# Patient Record
Sex: MALE | Race: WHITE | Employment: OTHER | URBAN - METROPOLITAN AREA
[De-identification: names, ages, dates, MRNs, and addresses within clinical notes are randomized per-mention and may not be internally consistent; named-entity substitution may affect disease eponyms.]

---

## 2021-04-02 ENCOUNTER — VIRTUAL VISIT (OUTPATIENT)
Dept: INTERNAL MEDICINE CLINIC | Age: 41
End: 2021-04-02

## 2021-04-02 DIAGNOSIS — M54.42 CHRONIC LOW BACK PAIN WITH LEFT-SIDED SCIATICA, UNSPECIFIED BACK PAIN LATERALITY: ICD-10-CM

## 2021-04-02 DIAGNOSIS — G89.29 CHRONIC LOW BACK PAIN WITH LEFT-SIDED SCIATICA, UNSPECIFIED BACK PAIN LATERALITY: ICD-10-CM

## 2021-04-02 DIAGNOSIS — R53.83 FATIGUE, UNSPECIFIED TYPE: Primary | ICD-10-CM

## 2021-04-02 PROCEDURE — 99202 OFFICE O/P NEW SF 15 MIN: CPT | Performed by: INTERNAL MEDICINE

## 2021-04-02 NOTE — PATIENT INSTRUCTIONS
Office Policies Phone calls/patient messages: Please allow up to 24 hours for someone in the office to contact you about your call or message. Be mindful your provider may be out of the office or your message may require further review. We encourage you to use [facility] for your messages as this is a faster, more efficient way to communicate with our office Medication Refills: 
         
Prescription medications require 48-72 business hours to process. We encourage you to use [facility] for your refills. For controlled medications: Please allow 72 business hours to process. Certain medications may require you to  a written prescription at our office. NO narcotic/controlled medications will be prescribed after 4pm Monday through Friday or on weekends Form/Paperwork Completion: 
         
Please note a $25 fee may incur for all paperwork for completed by our providers. We ask that you allow 7-10 business days. Pre-payment is due prior to picking up/faxing the completed form. You may also download your forms to [facility] to have your doctor print off. This is an established visit conducted via telemedicine. The patient has been instructed that this meets HIPAA criteria and acknowledges and agrees to this method of visitation.  
 
Skyler Bentley, [place] 
70/58/10 
69:22 AM

## 2021-04-02 NOTE — PROGRESS NOTES
HISTORY OF PRESENT ILLNESS  Evangeline Cooks is a 36 y.o. male. HPI     Evangeline Cooks, was evaluated through a synchronous (real-time) audio-video encounter. The patient (or guardian if applicable) is aware that this is a billable service. Verbal consent to proceed has been obtained within the past 12 months. The visit was conducted pursuant to the emergency declaration under the [address] [place] [address] and the [employer] and [employer] General Act. Patient identification was verified, and a caregiver was present when appropriate. The patient was located in a state where the provider was credentialed to provide care. --Nancy Ma MD on 4/2/2021 at 10:45 AM    An electronic signature was used to authenticate this note. Pt here to establish care  Former PCP-none    concerned about chronic low back pain x years  Has radiation of pain to left leg maybe once per week  No injury  Works in construction and painting    C/o chronc fatigue x years  No witnessed apnea per wife  Positive tension headaches  Feels like he does not get good quality sleep but usually sleeps through the night  Not depressed  Stable weight        There are no active problems to display for this patient.       Not on File   Lab Results   Component Value Date/Time    WBC 6.2 04/15/2021 05:04 PM    HGB 14.9 04/15/2021 05:04 PM    HCT 45.4 04/15/2021 05:04 PM    PLATELET 560 38/07/0199 05:04 PM    MCV 91.0 04/15/2021 05:04 PM     Lab Results   Component Value Date/Time    Glucose 98 04/15/2021 05:04 PM    Creatinine 0.99 04/15/2021 05:04 PM      No results found for: CHOL, CHOLPOCT, HDL, LDL, LDLC, LDLCPOC, LDLCEXT, TRIGL, TGLPOCT, CHHD, CHHDX  Lab Results   Component Value Date/Time    GFR est non-AA >60 04/15/2021 05:04 PM    GFR est AA >60 04/15/2021 05:04 PM    Creatinine 0.99 04/15/2021 05:04 PM    BUN 10 04/15/2021 05:04 PM    Sodium 139 04/15/2021 05:04 PM Potassium 4.3 04/15/2021 05:04 PM    Chloride 105 04/15/2021 05:04 PM    CO2 29 04/15/2021 05:04 PM        Review of Systems   Constitutional: Positive for malaise/fatigue. Negative for chills, fever and weight loss. HENT: Negative. Eyes: Negative for blurred vision and double vision. Respiratory: Negative for cough and shortness of breath. Cardiovascular: Negative for chest pain and palpitations. Gastrointestinal: Negative for abdominal pain, blood in stool, constipation, diarrhea, melena, nausea and vomiting. Genitourinary: Negative for dysuria, frequency, hematuria and urgency. Musculoskeletal: Positive for back pain. Negative for falls, joint pain and myalgias. Skin: Negative. Neurological: Negative for dizziness, tremors and headaches. Endo/Heme/Allergies: Negative. Psychiatric/Behavioral: Negative. Physical Exam  Pulmonary:      Effort: Pulmonary effort is normal.   Neurological:      General: No focal deficit present. Mental Status: He is alert. Psychiatric:         Mood and Affect: Mood normal.         Behavior: Behavior normal.         Thought Content: Thought content normal.         Judgment: Judgment normal.         ASSESSMENT and PLAN  Diagnoses and all orders for this visit:    1. Fatigue, unspecified type   Check labs    Refer to sleep MD -r/o Curt if labs unrevealing  2. Chronic low back pain with left-sided sciatica, unspecified back pain laterality   Discussed otc nsaids   Consider back xr and ortho spine MD referral   Pt prefers to hold off on referral and imaginig    Follow-up and Dispositions    · Return in about 1 year (around 4/2/2022) for CPE.

## 2021-04-15 ENCOUNTER — TRANSCRIBE ORDER (OUTPATIENT)
Dept: REGISTRATION | Age: 41
End: 2021-04-15

## 2021-04-15 ENCOUNTER — HOSPITAL ENCOUNTER (OUTPATIENT)
Dept: GENERAL RADIOLOGY | Age: 41
Discharge: HOME | End: 2021-04-15

## 2021-04-15 DIAGNOSIS — M54.42 ACUTE BACK PAIN WITH SCIATICA, LEFT: ICD-10-CM

## 2021-04-15 DIAGNOSIS — M54.42 ACUTE BACK PAIN WITH SCIATICA, LEFT: Primary | ICD-10-CM

## 2021-04-15 LAB
ALBUMIN SERPL-MCNC: 3.9 G/DL (ref 3.5–5)
ALBUMIN/GLOB SERPL: 1.3 {RATIO} (ref 1.1–2.2)
ALP SERPL-CCNC: 87 U/L (ref 45–117)
ALT SERPL-CCNC: 30 U/L (ref 12–78)
ANION GAP SERPL CALC-SCNC: 5 MMOL/L (ref 5–15)
AST SERPL-CCNC: 19 U/L (ref 15–37)
BASOPHILS # BLD: 0 K/UL (ref 0–0.1)
BASOPHILS NFR BLD: 1 % (ref 0–1)
BILIRUB SERPL-MCNC: 0.5 MG/DL (ref 0.2–1)
BUN SERPL-MCNC: 10 MG/DL (ref 6–20)
BUN/CREAT SERPL: 10 (ref 12–20)
CALCIUM SERPL-MCNC: 9.3 MG/DL (ref 8.5–10.1)
CHLORIDE SERPL-SCNC: 105 MMOL/L (ref 97–108)
CO2 SERPL-SCNC: 29 MMOL/L (ref 21–32)
CREAT SERPL-MCNC: 0.99 MG/DL (ref 0.7–1.3)
DIFFERENTIAL METHOD BLD: NORMAL
EOSINOPHIL # BLD: 0.3 K/UL (ref 0–0.4)
EOSINOPHIL NFR BLD: 5 % (ref 0–7)
ERYTHROCYTE [DISTWIDTH] IN BLOOD BY AUTOMATED COUNT: 13.2 % (ref 11.5–14.5)
GLOBULIN SER CALC-MCNC: 3.1 G/DL (ref 2–4)
GLUCOSE SERPL-MCNC: 98 MG/DL (ref 65–100)
HCT VFR BLD AUTO: 45.4 % (ref 36.6–50.3)
HGB BLD-MCNC: 14.9 G/DL (ref 12.1–17)
IMM GRANULOCYTES # BLD AUTO: 0 K/UL (ref 0–0.04)
IMM GRANULOCYTES NFR BLD AUTO: 0 % (ref 0–0.5)
LYMPHOCYTES # BLD: 2.9 K/UL (ref 0.8–3.5)
LYMPHOCYTES NFR BLD: 45 % (ref 12–49)
MCH RBC QN AUTO: 29.9 PG (ref 26–34)
MCHC RBC AUTO-ENTMCNC: 32.8 G/DL (ref 30–36.5)
MCV RBC AUTO: 91 FL (ref 80–99)
MONOCYTES # BLD: 0.6 K/UL (ref 0–1)
MONOCYTES NFR BLD: 10 % (ref 5–13)
NEUTS SEG # BLD: 2.4 K/UL (ref 1.8–8)
NEUTS SEG NFR BLD: 39 % (ref 32–75)
NRBC # BLD: 0 K/UL (ref 0–0.01)
NRBC BLD-RTO: 0 PER 100 WBC
PLATELET # BLD AUTO: 255 K/UL (ref 150–400)
PMV BLD AUTO: 10.1 FL (ref 8.9–12.9)
POTASSIUM SERPL-SCNC: 4.3 MMOL/L (ref 3.5–5.1)
PROT SERPL-MCNC: 7 G/DL (ref 6.4–8.2)
RBC # BLD AUTO: 4.99 M/UL (ref 4.1–5.7)
SODIUM SERPL-SCNC: 139 MMOL/L (ref 136–145)
TSH SERPL DL<=0.05 MIU/L-ACNC: 2.28 UIU/ML (ref 0.36–3.74)
WBC # BLD AUTO: 6.2 K/UL (ref 4.1–11.1)

## 2021-04-16 DIAGNOSIS — R53.83 FATIGUE, UNSPECIFIED TYPE: Primary | ICD-10-CM

## 2021-04-16 PROBLEM — G89.29 CHRONIC LOW BACK PAIN: Status: ACTIVE | Noted: 2021-04-16

## 2021-04-16 PROBLEM — M54.50 CHRONIC LOW BACK PAIN: Status: ACTIVE | Noted: 2021-04-16

## 2021-05-14 ENCOUNTER — OFFICE VISIT (OUTPATIENT)
Dept: SLEEP MEDICINE | Age: 41
End: 2021-05-14

## 2021-05-14 VITALS
WEIGHT: 192 LBS | SYSTOLIC BLOOD PRESSURE: 108 MMHG | BODY MASS INDEX: 27.49 KG/M2 | TEMPERATURE: 98.1 F | HEIGHT: 70 IN | HEART RATE: 68 BPM | DIASTOLIC BLOOD PRESSURE: 72 MMHG | OXYGEN SATURATION: 96 %

## 2021-05-14 DIAGNOSIS — G47.33 OSA (OBSTRUCTIVE SLEEP APNEA): Primary | ICD-10-CM

## 2021-05-14 PROCEDURE — 99203 OFFICE O/P NEW LOW 30 MIN: CPT | Performed by: SPECIALIST

## 2021-05-14 NOTE — PROGRESS NOTES
[address] [place], [address]  Tel.  [phone]  Fax. [address] [facility] [address]  [address], [address]  Tel.  [phone]  Fax. [phone] [address] [facility] Rod Abdullahi  Tel.  [phone]  Fax. [phone]       Chief Complaint       Chief Complaint   Patient presents with    Sleep Problem     NP; ref Dr Evelia Tai; fatigue; eval for cierra       HPI      Denisa Fernandez is 36 y.o. male seen for evaluation of a sleep disorder. Patient reports a 10-year history of being \"constantly tired\". He does note a history of snoring described as loud. He normally retires between 11: 30midnight and will awaken between 77: 30.  He may awaken once during the night. He awakens with an alarm. He denies vivid dreaming or nightmares, sleep talking or sleepwalking, bruxism or nocturnal incontinence, abnormal arm movements, hypnagogic hallucinations, sleep paralysis, cataplexy, apparent apnea, waking with a gasp or snort. He may doze if he is seated and inactive such as when reading or watching TV. Henderson Sleepiness Scale does not demonstrate pathologic daytime sleepiness. The patient has not undergone diagnostic testing for the current problems. Henderson Sleepiness Score: 8       No Known Allergies         He  has no past medical history on file. He  has no past surgical history on file. He family history includes Abdominal aortic aneurysm in his mother; Diabetes in his mother; Obesity in his mother. He  reports that he has never smoked. He has never used smokeless tobacco. He reports that he does not drink alcohol or use drugs. Review of Systems:  Review of Systems   Constitutional: Negative for chills and fever. HENT: Positive for tinnitus. Negative for hearing loss. Eyes: Positive for blurred vision. Negative for double vision. Respiratory: Positive for shortness of breath. Negative for cough and wheezing. Cardiovascular: Negative for chest pain and palpitations. Gastrointestinal: Positive for heartburn and nausea. Genitourinary: Positive for frequency. Musculoskeletal: Positive for back pain, joint pain and neck pain. Skin: Negative for itching and rash. Neurological: Positive for dizziness and headaches. Psychiatric/Behavioral: Negative for depression. The patient is not nervous/anxious. Objective:     Visit Vitals  /72 (BP 1 Location: Left upper arm, BP Patient Position: Sitting, BP Cuff Size: Adult long)   Pulse 68   Temp 98.1 °F (36.7 °C) (Temporal)   Ht 5' 10\" (1.778 m)   Wt 192 lb (87.1 kg)   SpO2 96%   BMI 27.55 kg/m²     Body mass index is 27.55 kg/m². General:   Conversant, cooperative   Eyes:  Pupils equal and reactive, no nystagmus   Oropharynx:   Mallampati score IV, tongue scalloped, uvula prominent       Neck:   No carotid bruits; Neck circ. in \"inches\": 15   Chest/Lungs:  Clear on auscultation    CVS:  Normal rate, regular rhythm   Skin:  Warm to touch; no obvious rashes   Neuro:  Speech fluent, face symmetrical, tongue movement normal   Psych:  Normal affect,  normal countenance        Assessment:       ICD-10-CM ICD-9-CM    1. BALJIT (obstructive sleep apnea)  G47.33 327.23 SLEEP STUDY UNATTENDED, 4 CHANNEL     History of snoring. Patient does not report abnormal daytime sleepiness but generalized fatigue. He does have a small oral airway. Will be evaluated with a home sleep test.      Plan:     Orders Placed This Encounter    SLEEP STUDY UNATTENDED, 4 CHANNEL     Order Specific Question:   Reason for Exam     Answer:   fatigue       * Sleep testing was ordered for initial evaluation. * He was provided information on sleep apnea including corresponding risk factors and the importance of proper treatment. * Treatment options if indicated were reviewed today.       Instructions:    o Do not engage in activities requiring a normal degree of alertness if fatigue is present. o The patient understands that untreated or undertreated sleep apnea could impair judgement and the ability to function normally during the day.  o Call or return if symptoms worsen or persist.          Florecita Millard MD, [facility]  Electronically signed 05/14/21       This note was created using voice recognition software. Despite editing, there may be syntax errors. This note will not be viewable in [address]

## 2021-05-22 ENCOUNTER — HOSPITAL ENCOUNTER (OUTPATIENT)
Dept: SLEEP MEDICINE | Age: 41
Discharge: HOME | End: 2021-05-22

## 2021-05-22 PROCEDURE — 95806 SLEEP STUDY UNATT&RESP EFFT: CPT | Performed by: SPECIALIST

## 2021-06-30 ENCOUNTER — TELEPHONE (OUTPATIENT)
Dept: SLEEP MEDICINE | Age: 41
End: 2021-06-30

## 2021-06-30 DIAGNOSIS — G47.33 OSA (OBSTRUCTIVE SLEEP APNEA): Primary | ICD-10-CM

## 2021-07-20 ENCOUNTER — OFFICE VISIT (OUTPATIENT)
Dept: SLEEP MEDICINE | Age: 41
End: 2021-07-20

## 2021-07-20 DIAGNOSIS — G47.33 OSA (OBSTRUCTIVE SLEEP APNEA): Primary | ICD-10-CM

## 2021-07-20 NOTE — PROGRESS NOTES
[address] [facility], [address]  Tel.  [phone]  Fax. [address]  [place], [address] [facility]  Tel.  [phone]  Fax. [phone] [address] [facility] BradlyRod juarez   Tel.  [phone]  Fax. [phone]       S>Socrates Pires is a 36 y.o. male seen today to receive a home sleep testing unit (HST). · Patient was educated on proper hookup and operation of the HST. · Instruction forms and documentation were reviewed and signed. · The patient demonstrated good understanding of the HST.    O>    There were no vitals taken for this visit. A>  No diagnosis found. P>  · General information regarding operations and maintenance of the device was provided. · He was provided information on sleep apnea including coresponding risk factors and the importance of proper treatment. · Follow-up appointment was made to return the HST. He will be contacted once the results have been reviewed. · He was asked to contact our office for any problems regarding his home sleep test study.    · HSAT Sn [id number]

## 2021-07-30 ENCOUNTER — TELEPHONE (OUTPATIENT)
Dept: SLEEP MEDICINE | Age: 41
End: 2021-07-30

## 2021-07-30 NOTE — TELEPHONE ENCOUNTER
HSAT performed for potential sleep disordered breathing. Overall AHI 6/h associated with minimal SaO2 of 77%. Snoring during 9.1% of the study. Events primarily supine with a supine-related AHI of 11.1/h. Impression: Supine related sleep disordered breathing. Study potentially underestimated severity of sleep disordered breathing as P flow signal loss for much of the initial period of supine sleep during which time desaturations were noted. Recommendation: Positional therapy may be preferable. Sleep technologist: Please review HSAT results with the patient. Suggest techniques to limit supine sleep, slumber bump or similar.

## 2021-09-23 ENCOUNTER — TELEPHONE (OUTPATIENT)
Dept: SLEEP MEDICINE | Age: 41
End: 2021-09-23

## 2021-09-24 ENCOUNTER — TELEPHONE (OUTPATIENT)
Dept: SLEEP MEDICINE | Age: 41
End: 2021-09-24

## 2021-09-30 NOTE — TELEPHONE ENCOUNTER
Reviewed sleep study results with patient. He expressed understanding. Patient has questions regarding the bill for 2 HSATs. Will defer to Veronica Unger.

## 2022-03-18 PROBLEM — G89.29 CHRONIC LOW BACK PAIN: Status: ACTIVE | Noted: 2021-04-16

## 2022-03-18 PROBLEM — M54.50 CHRONIC LOW BACK PAIN: Status: ACTIVE | Noted: 2021-04-16

## 2024-08-26 ENCOUNTER — APPOINTMENT (OUTPATIENT)
Facility: HOSPITAL | Age: 44
End: 2024-08-26

## 2024-08-26 ENCOUNTER — HOSPITAL ENCOUNTER (EMERGENCY)
Facility: HOSPITAL | Age: 44
Discharge: HOME | End: 2024-08-26
Attending: EMERGENCY MEDICINE

## 2024-08-26 VITALS
SYSTOLIC BLOOD PRESSURE: 109 MMHG | TEMPERATURE: 98.2 F | OXYGEN SATURATION: 97 % | DIASTOLIC BLOOD PRESSURE: 72 MMHG | BODY MASS INDEX: 27.2 KG/M2 | RESPIRATION RATE: 20 BRPM | WEIGHT: 190 LBS | HEIGHT: 70 IN | HEART RATE: 92 BPM

## 2024-08-26 DIAGNOSIS — I95.1 ORTHOSTASIS: ICD-10-CM

## 2024-08-26 DIAGNOSIS — R55 SYNCOPE AND COLLAPSE: Primary | ICD-10-CM

## 2024-08-26 DIAGNOSIS — R19.7 DIARRHEA, UNSPECIFIED TYPE: ICD-10-CM

## 2024-08-26 DIAGNOSIS — S01.81XA FACIAL LACERATION, INITIAL ENCOUNTER: ICD-10-CM

## 2024-08-26 LAB
ALBUMIN SERPL-MCNC: 4.1 G/DL (ref 3.5–5)
ALBUMIN/GLOB SERPL: 1.1 (ref 1.1–2.2)
ALP SERPL-CCNC: 76 U/L (ref 45–117)
ALT SERPL-CCNC: 41 U/L (ref 12–78)
ANION GAP SERPL CALC-SCNC: 4 MMOL/L (ref 5–15)
AST SERPL-CCNC: 25 U/L (ref 15–37)
BASOPHILS # BLD: 0 K/UL (ref 0–0.1)
BASOPHILS NFR BLD: 0 % (ref 0–1)
BILIRUB SERPL-MCNC: 0.6 MG/DL (ref 0.2–1)
BUN SERPL-MCNC: 16 MG/DL (ref 6–20)
BUN/CREAT SERPL: 13 (ref 12–20)
CALCIUM SERPL-MCNC: 9 MG/DL (ref 8.5–10.1)
CHLORIDE SERPL-SCNC: 104 MMOL/L (ref 97–108)
CO2 SERPL-SCNC: 29 MMOL/L (ref 21–32)
COMMENT:: NORMAL
CREAT SERPL-MCNC: 1.19 MG/DL (ref 0.7–1.3)
DIFFERENTIAL METHOD BLD: ABNORMAL
EKG ATRIAL RATE: 80 BPM
EKG DIAGNOSIS: NORMAL
EKG P AXIS: 64 DEGREES
EKG P-R INTERVAL: 150 MS
EKG Q-T INTERVAL: 364 MS
EKG QRS DURATION: 100 MS
EKG QTC CALCULATION (BAZETT): 419 MS
EKG R AXIS: -10 DEGREES
EKG T AXIS: 10 DEGREES
EKG VENTRICULAR RATE: 80 BPM
EOSINOPHIL # BLD: 0.1 K/UL (ref 0–0.4)
EOSINOPHIL NFR BLD: 1 % (ref 0–7)
ERYTHROCYTE [DISTWIDTH] IN BLOOD BY AUTOMATED COUNT: 12.7 % (ref 11.5–14.5)
ETHANOL SERPL-MCNC: <10 MG/DL (ref 0–0.08)
GLOBULIN SER CALC-MCNC: 3.6 G/DL (ref 2–4)
GLUCOSE SERPL-MCNC: 131 MG/DL (ref 65–100)
HCT VFR BLD AUTO: 46.4 % (ref 36.6–50.3)
HGB BLD-MCNC: 15.7 G/DL (ref 12.1–17)
IMM GRANULOCYTES # BLD AUTO: 0.1 K/UL (ref 0–0.04)
IMM GRANULOCYTES NFR BLD AUTO: 0 % (ref 0–0.5)
LYMPHOCYTES # BLD: 3.4 K/UL (ref 0.8–3.5)
LYMPHOCYTES NFR BLD: 25 % (ref 12–49)
MAGNESIUM SERPL-MCNC: 2.2 MG/DL (ref 1.6–2.4)
MCH RBC QN AUTO: 30 PG (ref 26–34)
MCHC RBC AUTO-ENTMCNC: 33.8 G/DL (ref 30–36.5)
MCV RBC AUTO: 88.7 FL (ref 80–99)
MONOCYTES # BLD: 0.9 K/UL (ref 0–1)
MONOCYTES NFR BLD: 6 % (ref 5–13)
NEUTS SEG # BLD: 8.9 K/UL (ref 1.8–8)
NEUTS SEG NFR BLD: 68 % (ref 32–75)
NRBC # BLD: 0 K/UL (ref 0–0.01)
NRBC BLD-RTO: 0 PER 100 WBC
PLATELET # BLD AUTO: 285 K/UL (ref 150–400)
PMV BLD AUTO: 9.4 FL (ref 8.9–12.9)
POTASSIUM SERPL-SCNC: 3.5 MMOL/L (ref 3.5–5.1)
PROT SERPL-MCNC: 7.7 G/DL (ref 6.4–8.2)
RBC # BLD AUTO: 5.23 M/UL (ref 4.1–5.7)
SODIUM SERPL-SCNC: 137 MMOL/L (ref 136–145)
SPECIMEN HOLD: NORMAL
TROPONIN I SERPL HS-MCNC: 4 NG/L (ref 0–76)
WBC # BLD AUTO: 13.4 K/UL (ref 4.1–11.1)

## 2024-08-26 PROCEDURE — 82077 ASSAY SPEC XCP UR&BREATH IA: CPT

## 2024-08-26 PROCEDURE — 90714 TD VACC NO PRESV 7 YRS+ IM: CPT | Performed by: EMERGENCY MEDICINE

## 2024-08-26 PROCEDURE — 96374 THER/PROPH/DIAG INJ IV PUSH: CPT

## 2024-08-26 PROCEDURE — 96361 HYDRATE IV INFUSION ADD-ON: CPT

## 2024-08-26 PROCEDURE — 93010 ELECTROCARDIOGRAM REPORT: CPT | Performed by: INTERNAL MEDICINE

## 2024-08-26 PROCEDURE — 99284 EMERGENCY DEPT VISIT MOD MDM: CPT

## 2024-08-26 PROCEDURE — 70450 CT HEAD/BRAIN W/O DYE: CPT

## 2024-08-26 PROCEDURE — 12014 RPR F/E/E/N/L/M 5.1-7.5 CM: CPT

## 2024-08-26 PROCEDURE — 83735 ASSAY OF MAGNESIUM: CPT

## 2024-08-26 PROCEDURE — 84484 ASSAY OF TROPONIN QUANT: CPT

## 2024-08-26 PROCEDURE — 90471 IMMUNIZATION ADMIN: CPT | Performed by: EMERGENCY MEDICINE

## 2024-08-26 PROCEDURE — 2500000003 HC RX 250 WO HCPCS: Performed by: EMERGENCY MEDICINE

## 2024-08-26 PROCEDURE — 6360000002 HC RX W HCPCS: Performed by: EMERGENCY MEDICINE

## 2024-08-26 PROCEDURE — 80053 COMPREHEN METABOLIC PANEL: CPT

## 2024-08-26 PROCEDURE — 93005 ELECTROCARDIOGRAM TRACING: CPT | Performed by: EMERGENCY MEDICINE

## 2024-08-26 PROCEDURE — 85025 COMPLETE CBC W/AUTO DIFF WBC: CPT

## 2024-08-26 PROCEDURE — 2580000003 HC RX 258: Performed by: EMERGENCY MEDICINE

## 2024-08-26 RX ORDER — 0.9 % SODIUM CHLORIDE 0.9 %
1000 INTRAVENOUS SOLUTION INTRAVENOUS ONCE
Status: COMPLETED | OUTPATIENT
Start: 2024-08-26 | End: 2024-08-26

## 2024-08-26 RX ORDER — LIDOCAINE HYDROCHLORIDE 10 MG/ML
10 INJECTION, SOLUTION EPIDURAL; INFILTRATION; INTRACAUDAL; PERINEURAL ONCE
Status: COMPLETED | OUTPATIENT
Start: 2024-08-26 | End: 2024-08-26

## 2024-08-26 RX ORDER — ONDANSETRON 2 MG/ML
4 INJECTION INTRAMUSCULAR; INTRAVENOUS ONCE
Status: COMPLETED | OUTPATIENT
Start: 2024-08-26 | End: 2024-08-26

## 2024-08-26 RX ADMIN — CLOSTRIDIUM TETANI TOXOID ANTIGEN (FORMALDEHYDE INACTIVATED) AND CORYNEBACTERIUM DIPHTHERIAE TOXOID ANTIGEN (FORMALDEHYDE INACTIVATED) 0.5 ML: 5; 2 INJECTION, SUSPENSION INTRAMUSCULAR at 04:27

## 2024-08-26 RX ADMIN — LIDOCAINE HYDROCHLORIDE 10 ML: 10 INJECTION, SOLUTION EPIDURAL; INFILTRATION; INTRACAUDAL; PERINEURAL at 04:30

## 2024-08-26 RX ADMIN — SODIUM CHLORIDE 1000 ML: 9 INJECTION, SOLUTION INTRAVENOUS at 04:12

## 2024-08-26 RX ADMIN — ONDANSETRON 4 MG: 2 INJECTION INTRAMUSCULAR; INTRAVENOUS at 04:05

## 2024-08-26 ASSESSMENT — PAIN - FUNCTIONAL ASSESSMENT: PAIN_FUNCTIONAL_ASSESSMENT: 0-10

## 2024-08-26 ASSESSMENT — PAIN SCALES - GENERAL: PAINLEVEL_OUTOF10: 0

## 2024-08-26 NOTE — ED TRIAGE NOTES
Pt to triage c/o having multiple episodes of diarrhea last night and when he got up in the middle of the night to go to the bathroom, he passed out and fell off the toilet. He fell two more times, the last one being down the steps. Patient has a gash above his left eyebrow.     Denies headache. Endorses throwing up in the car on the way here. States he feels dehydrated

## 2024-08-26 NOTE — ED PROVIDER NOTES
[facility] EMERGENCY DEPT  EMERGENCY DEPARTMENT ENCOUNTER      Pt Name: Wilbert Andrea  MRN: [id number]  Birthdate 1980  Date of evaluation: 8/26/2024  Provider: Reji Navarro MD      HISTORY OF PRESENT ILLNESS      HPI  Healthy 43-year-old man presenting to the emergency department due to syncope and facial laceration.  Patient was in his normal state of health about 2 days ago but the day prior he had several loose bowel movements during the day.  He says his stomach was turning a little bit but it was not really painful and he had no nausea or vomiting.  He did not think much of it but late last night he developed multiple episodes of watery diarrhea.  He says he was straining to have a bowel movement and then passed out on the toilet.  He is not sure if he hit his head and got up.  He was walking upstairs and had another syncopal episode where he hit his forehead and sustained a laceration after hitting on the hardwood floor.  He again got up and had another syncopal episode.  His wife states he was Ativan when she came to his aid.  She noticed bleeding and brought him to the emergency department.  In the car on the way over he had nausea and vomiting.  He denies minimal headache.  No visual changes.  No neck pain.  Not anticoagulated.  Denies abdominal pain currently.  No chest pain shortness of breath or palpitations.      Nursing Notes were reviewed.    REVIEW OF SYSTEMS         Review of Systems  All systems reviewed were negative unless otherwise documented in the [facility]      PAST MEDICAL HISTORY   No past medical history on file.      SURGICAL HISTORY     No past surgical history on file.      CURRENT MEDICATIONS       Previous Medications    No medications on file       ALLERGIES     Patient has no known allergies.    FAMILY HISTORY       Family History   Problem Relation Age of Onset    Abdominal aortic aneurysm Mother     Obesity Mother     Diabetes Mother           SOCIAL HISTORY       Social  drug management.      Patient presenting with the above chief complaint.  Vital signs are stable.  He has about a 6 cm laceration above his left eyebrow.  No other signs of trauma on exam.  Head CT ordered to rule out any intracranial hemorrhage or skull fracture.  No cervical spine tenderness to suggest C-spine fracture.  I did ask nursing to obtain orthostatic vital signs and a 20 point drop in his blood pressure from lying down to sitting.  He also became very dizzy and bradycardic when he stood up.  I suspect his syncopal episodes were secondary to orthostasis in the setting of his diarrhea and volume losses.    Labs are grossly reassuring.  Head CT unremarkable.  After 2 L of IV fluid patient feeling better and able to ambulate under his own power.  Laceration was repaired.  Tetanus shot updated.  Patient encouraged to increase his fluid intake and to return with any new or worsening symptoms.  He was instructed when to have his sutures removed and discharged in stable condition      REASSESSMENT     ED Course as of 08/26/24 0632   Mon Aug 26, 2024   0402 EKG shows normal sinus rhythm with a rate of 80.  QTc 419.  No arrhythmias or ischemic changes [MM]      ED Course User Index  [MM] Reji Navarro MD         CONSULTS:  None    PROCEDURES:     Lac Repair    Date/Time: 8/26/2024 10:14 PM    Performed by: Reji Navarro MD  Authorized by: Reji Navarro MD    Consent:     Consent obtained:  Verbal    Consent given by:  Patient    Risks discussed:  Poor cosmetic result and pain  Universal protocol:     Procedure explained and questions answered to patient or proxy's satisfaction: yes      Patient identity confirmed:  Provided demographic data  Anesthesia:     Anesthesia method:  Local infiltration    Local anesthetic:  Lidocaine 1% w/o epi  Laceration details:     Location:  Face    Facial location: Above left eye.    Length (cm):  6  Pre-procedure details:     Preparation:  Imaging obtained to evaluate for

## 2024-08-26 NOTE — ED NOTES
Attempted to obtain orthostatic vital signs on patient, stood patient up and asked how he was feeling, patient stated he felt okay. Waited two minutes, while blood pressure was running patient stated he was feeling nauseous, started feeling faint. Patient's pulse dropped from 80 to 50. Assisted patient back into bed with help of charge RN, provider notified and at bedside. Pt vomited small amount.

## 2024-08-26 NOTE — ED NOTES
This RN completed ambulation test with patient. Assist x 2 with Nimesh RN to the bathroom. Gait belt applied. Patient tolerated well. This RN assisted patient out of bathroom and ambulated patient down hallway and back. Patient tolerated well and stated he was not feeling nauseous or dizzy.

## 2024-08-26 NOTE — DISCHARGE INSTRUCTIONS
You should have your sutures removed in 7 days.  Please keep them as clean and dry as possible.  Make sure you are drinking plenty of fluids to the point where your urine is nearly clear.  For the diarrhea if needed you can try Imodium which she can get over-the-counter.  Return with any new or worsening symptoms

## 2024-11-27 ENCOUNTER — OFFICE VISIT (OUTPATIENT)
Facility: CLINIC | Age: 44
End: 2024-11-27

## 2024-11-27 VITALS
SYSTOLIC BLOOD PRESSURE: 134 MMHG | TEMPERATURE: 98 F | DIASTOLIC BLOOD PRESSURE: 85 MMHG | OXYGEN SATURATION: 98 % | HEART RATE: 73 BPM | RESPIRATION RATE: 20 BRPM | BODY MASS INDEX: 28.06 KG/M2 | WEIGHT: 196 LBS | HEIGHT: 70 IN

## 2024-11-27 DIAGNOSIS — Z13.220 LIPID SCREENING: ICD-10-CM

## 2024-11-27 DIAGNOSIS — G89.29 CHRONIC LEFT-SIDED LOW BACK PAIN WITH LEFT-SIDED SCIATICA: Primary | ICD-10-CM

## 2024-11-27 DIAGNOSIS — R53.82 CHRONIC FATIGUE: ICD-10-CM

## 2024-11-27 DIAGNOSIS — R06.83 SNORING: ICD-10-CM

## 2024-11-27 DIAGNOSIS — M54.42 CHRONIC LEFT-SIDED LOW BACK PAIN WITH LEFT-SIDED SCIATICA: Primary | ICD-10-CM

## 2024-11-27 DIAGNOSIS — Z11.59 NEED FOR HEPATITIS C SCREENING TEST: ICD-10-CM

## 2024-11-27 DIAGNOSIS — Z13.1 ENCOUNTER FOR SCREENING FOR DIABETES MELLITUS: ICD-10-CM

## 2024-11-27 DIAGNOSIS — J34.2 DEVIATED SEPTUM: ICD-10-CM

## 2024-11-27 PROCEDURE — 99204 OFFICE O/P NEW MOD 45 MIN: CPT | Performed by: FAMILY MEDICINE

## 2024-11-27 SDOH — ECONOMIC STABILITY: FOOD INSECURITY: WITHIN THE PAST 12 MONTHS, THE FOOD YOU BOUGHT JUST DIDN'T LAST AND YOU DIDN'T HAVE MONEY TO GET MORE.: NEVER TRUE

## 2024-11-27 SDOH — ECONOMIC STABILITY: FOOD INSECURITY: WITHIN THE PAST 12 MONTHS, YOU WORRIED THAT YOUR FOOD WOULD RUN OUT BEFORE YOU GOT MONEY TO BUY MORE.: NEVER TRUE

## 2024-11-27 SDOH — ECONOMIC STABILITY: INCOME INSECURITY: HOW HARD IS IT FOR YOU TO PAY FOR THE VERY BASICS LIKE FOOD, HOUSING, MEDICAL CARE, AND HEATING?: NOT HARD AT ALL

## 2024-11-27 ASSESSMENT — PATIENT HEALTH QUESTIONNAIRE - PHQ9
SUM OF ALL RESPONSES TO PHQ QUESTIONS 1-9: 0
SUM OF ALL RESPONSES TO PHQ9 QUESTIONS 1 & 2: 0
SUM OF ALL RESPONSES TO PHQ QUESTIONS 1-9: 0
1. LITTLE INTEREST OR PLEASURE IN DOING THINGS: NOT AT ALL
SUM OF ALL RESPONSES TO PHQ QUESTIONS 1-9: 0
2. FEELING DOWN, DEPRESSED OR HOPELESS: NOT AT ALL
SUM OF ALL RESPONSES TO PHQ QUESTIONS 1-9: 0

## 2024-11-27 NOTE — PROGRESS NOTES
Assessment & Plan  1. Chronic left-sided low back pain.  The chronic left-sided low back pain is likely due to a pinched nerve causing muscle spasms. The pain has been present for nearly a year and has worsened over the last two months despite taking Advil. He has been taking two over-the-counter strength Advil twice a day for about six months, and more recently, has doubled the dose. Chiropractic care was attempted but did not help and seemed to exacerbate the pain, transforming it into sciatica. The pain is localized in the L5-S1 area with some SI joint involvement and radiates down to the mid-shin, sometimes causing tingling in the big toe. Physical therapy maneuvers aimed at decompressing the affected area, maintaining flexibility, and strengthening the core muscles are recommended. Core exercises, such as the cat-cow yoga pose, can help alleviate pressure on the back and realign the discs. A specific set of home exercises will be provided to start while awaiting the physical therapy appointment.      2. Chronic Fatigue  Going on several years, associated with snoring, not well rested despite good sleep time. Some decreased libido. Hx deviated septum 2/2 broken nose, mouth breathing.  No exertional symptoms. Poor eating habits and inactive.   Recommend basic labs, vitamin check, testosterone check, sleep apnea evaluation, work on lifestyle improvements. Education provided.    It was a pleasure seeing Mr. Wilbert Andrea today.  Return in about 4 months (around 3/27/2025) for chronic back pain and fatigue management.    History of Present Illness  The patient is a 43-year-old male who is a new patient, here to establish care. He needs to discuss chronic low back pain and left-sided hip pain that he has been experiencing for nearly a year.    He has been managing his pain with Advil, taking two over-the-counter strength doses twice daily for the past six months. However, in the last two months, he has increased

## 2024-11-27 NOTE — PATIENT INSTRUCTIONS
Thera-cane for self-massage off of [employer] or at a local medical supply store. Use it to apply consistent constant pressure to tight musculature to force the muscle to release/relax.        Keywords and Lifestyle Strategies to consider and look up:    ACTIVITY:  General Movement is important to burn calories, keep your metabolism and mitochondria moving and functioning. Our bodies are built to be moving no less than 10,000 steps per day, and really we should strive for 20,000 steps per day. At the end of the day we should be able to say that we have been moving more than sitting or standing still.    Cardio keeps the heart strong, 30 minutes of moderate activity 5 days per week is the goal.    Strength and muscle building burns fats even on rest/recover days, and can even help with healthy physiologic testosterone production (men and women) to make it easier to regulate your metabolism.    High Intensity Interval Training or H-I-I-T; a method of exercising more intensely with a cominbination of muscle/strength techniques and cardio for 15-20 minutes 3 days per week.    FOODS:  LOW CARBOHYDRATE. HIGH FIBER. HEALTHY FATS. ANTIOXIDANT RICH.    Low carbohydrates:  Carbohydrates are a luxury energy source, quick burning, quick storage, inflammation promoting.    Carbs include simple sugars found in soda pop, sweet tea, juices, and desserts. Other carbohydrate sources that lead to trouble is found in white rice, pasta, noodles, white bread, white potatoes.    But sometimes we should eat some carbs, but they need to be high fiber. In fact, in nature carbohydrates are never found without fiber or protein, but modern food processing has removed those important ingredients,  things that never should have been .     Some examples of healthy grains/carbs include spelt, Khorasan wheat ([place]), einkorn, and emmer; the grains millet, barley, teff, oats, and sorghum; and the pseudocereals quinoa, amaranth,

## 2024-11-27 NOTE — PROGRESS NOTES
Chief Complaint   Patient presents with    Establish Care     No know injury   Hip and back pain on left side x 1 year   Pt has never been to PT or seen ortho, has seen a chiropractor      \"Have you been to the ER, urgent care clinic since your last visit?  Hospitalized since your last visit?\"    NO    “Have you seen or consulted any other health care providers outside of [facility] since your last visit?”    NO            Click Here for Release of Records Request

## 2024-12-18 ENCOUNTER — HOSPITAL ENCOUNTER (OUTPATIENT)
Facility: HOSPITAL | Age: 44
Setting detail: RECURRING SERIES
Discharge: HOME | End: 2024-12-21
Attending: FAMILY MEDICINE

## 2024-12-18 DIAGNOSIS — M54.42 CHRONIC LEFT-SIDED LOW BACK PAIN WITH LEFT-SIDED SCIATICA: ICD-10-CM

## 2024-12-18 DIAGNOSIS — G89.29 CHRONIC LEFT-SIDED LOW BACK PAIN WITH LEFT-SIDED SCIATICA: ICD-10-CM

## 2024-12-18 PROCEDURE — 97110 THERAPEUTIC EXERCISES: CPT

## 2024-12-18 PROCEDURE — 97162 PT EVAL MOD COMPLEX 30 MIN: CPT

## 2024-12-18 NOTE — THERAPY EVALUATION
Physical Therapy at [place],   a part of [address], [address]  [place] [address]  Phone: [phone]  Fax: [phone]       PHYSICAL THERAPY - EVALUATION/PLAN OF CARE NOTE (updated 3/23)      Date: 2024          Patient Name:  Wilbert Andrea :  1980   Medical   Diagnosis:  Chronic left-sided low back pain with left-sided sciatica [M54.42, G89.29] Treatment Diagnosis:  M54.59  OTHER LOWER BACK PAIN    Referral Source:  Jean Saenz MD Provider #:  [id number]                Insurance: Payor: /      Patient  verified yes     Visit #   Current  / Total 1 6   Time   In / Out 9:20 a 10:30 a   Total Treatment Time 70   Total Timed Codes 20         SUBJECTIVE  Pain Level (0-10 scale): Current- 3, Best- 1, Worst- 8    []constant []intermittent []improving [x]worsening []no change since onset    Any medication changes, allergies to medications, adverse drug reactions, diagnosis change, or new procedure performed?: [x] No    [] Yes (see summary sheet for update)  Medications: Verified on Patient Summary List    Subjective functional status/changes:       Start of Care: 2024  Onset Date:     Current symptoms/Complaints: L sided low back pain.  He reports general low back pain for about 10 years but in the last year or so he's had a sciatic nerve pain, mostly hurts right in the middle of the L hip.  Pain began when he was seeing a chiropractor.  He's taking Advil 1-2 times/day which helps.  Pain is worse by the end of the day but no specific movement aggravates the pain.  Pain is alleviated by laying flat.  He was referred for an x-ray but has not yet received this.  Sometimes he will feel paresthesia into his feet.  No concerns with his balance, no sx on the R side, no changes in bowel or bladder function.      Mechanism of Injury: Overuse   PLOF: Chronic LBP, works in construction   Limitations to PLOF/Activity or

## 2025-03-19 ENCOUNTER — TELEPHONE (OUTPATIENT)
Facility: CLINIC | Age: 45
End: 2025-03-19

## 2025-03-19 NOTE — TELEPHONE ENCOUNTER
Jay Jay Grace, LPN  You1 hour ago (10:20 AM)     AC  Patient requests that appointment be canceled and states that he will return call to office to reschedule at a later time.

## 2025-03-19 NOTE — TELEPHONE ENCOUNTER
Lab orders placed. 11/27/2024.    Please call pt, and ask that male have labs drawn prior to scheduled appt, so results can be discussed at that time.